# Patient Record
Sex: FEMALE | Race: OTHER | Employment: STUDENT | URBAN - METROPOLITAN AREA
[De-identification: names, ages, dates, MRNs, and addresses within clinical notes are randomized per-mention and may not be internally consistent; named-entity substitution may affect disease eponyms.]

---
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
## 2025-02-25 ENCOUNTER — OFFICE VISIT (OUTPATIENT)
Dept: OTOLARYNGOLOGY | Facility: CLINIC | Age: 20
End: 2025-02-25

## 2025-02-25 DIAGNOSIS — H60.393 OTHER INFECTIVE ACUTE OTITIS EXTERNA OF BOTH EARS: Primary | ICD-10-CM

## 2025-02-25 PROCEDURE — 99203 OFFICE O/P NEW LOW 30 MIN: CPT | Performed by: OTOLARYNGOLOGY

## 2025-02-25 PROCEDURE — 92504 EAR MICROSCOPY EXAMINATION: CPT | Performed by: OTOLARYNGOLOGY

## 2025-02-25 RX ORDER — CIPROFLOXACIN AND DEXAMETHASONE 3; 1 MG/ML; MG/ML
3 SUSPENSION/ DROPS AURICULAR (OTIC) 3 TIMES DAILY
Qty: 1 EACH | Refills: 0 | Status: SHIPPED | OUTPATIENT
Start: 2025-02-25 | End: 2025-03-14

## 2025-02-25 RX ORDER — CIPROFLOXACIN 500 MG/1
500 TABLET, FILM COATED ORAL EVERY 12 HOURS
Qty: 14 TABLET | Refills: 0 | Status: SHIPPED | OUTPATIENT
Start: 2025-02-25

## 2025-02-25 NOTE — PROGRESS NOTES
Rosina John is a 20 year old female.    Chief Complaint   Patient presents with    Ear Problem     Reports Bilateral double ear infection, C/o hearing decrease. X 2 days        HISTORY OF PRESENT ILLNESS  She presents with history of recurrent infections with about 2-year diagnosed since she has been to college.  She presents today having decreased hearing and increased ear pain is ago in the ED at her school in [facility] with a diagnosis of ear infection.  She was given amoxicillin had her ears irrigated and now presents with worsening pain and discomfort.  Cannot hear out of either ear.      Social History     Socioeconomic History    Marital status: Single   Tobacco Use    Smoking status: Never     Passive exposure: Never    Smokeless tobacco: Never   Vaping Use    Vaping status: Never Used   Substance and Sexual Activity    Alcohol use: Not Currently    Drug use: Never       History reviewed. No pertinent family history.    History reviewed. No pertinent past medical history.    History reviewed. No pertinent surgical history.      REVIEW OF SYSTEMS    System Neg/Pos Details   Constitutional Negative Fatigue, fever and weight loss.   ENMT Negative Drooling.   Eyes Negative Blurred vision and vision changes.   Respiratory Negative Dyspnea and wheezing.   Cardio Negative Chest pain, irregular heartbeat/palpitations and syncope.   GI Negative Abdominal pain and diarrhea.   Endocrine Negative Cold intolerance and heat intolerance.   Neuro Negative Tremors.   Psych Negative Anxiety and depression.   Integumentary Negative Frequent skin infections, pigment change and rash.   Hema/Lymph Negative Easy bleeding and easy bruising.           PHYSICAL EXAM    There were no vitals taken for this visit.       Constitutional Normal Overall appearance - Normal.   Psychiatric Normal Orientation - Oriented to time, place, person & situation. Appropriate mood and affect.   Neck Exam Normal  Inspection - Normal. Palpation - Normal. Parotid gland - Normal. Thyroid gland - Normal.   Eyes Normal Conjunctiva - Right: Normal, Left: Normal. Pupil - Right: Normal, Left: Normal. Fundus - Right: Normal, Left: Normal.   Neurological Normal Memory - Normal. Cranial nerves - Cranial nerves II through XII grossly intact.   Head/Face Normal Facial features - Normal. Eyebrows - Normal. Skull - Normal.        Nasopharynx Normal External nose - Normal. Lips/teeth/gums - Normal. Tonsils - Normal. Oropharynx - Normal.   Ears Normal Inspection - Right: Normal, Left: Normal. Canal - Right: Narrowed infected left: Narrowed infected TM - Right: Normal, Left: Normal.   Skin Normal Inspection - Normal.        Lymph Detail Normal Submental. Submandibular. Anterior cervical. Posterior cervical. Supraclavicular.        Nose/Mouth/Throat Normal External nose - Normal. Lips/teeth/gums - Normal. Tonsils - Normal. Oropharynx - Normal.   Nose/Mouth/Throat Normal Nares - Right: Normal Left: Normal. Septum -Normal  Turbinates - Right: Normal, Left: Normal.   Microscopy  Binocular microscopy was performed. The affected ear(s) was/were examined and all debris removed using suction. The findings are described in the physical exam.   Bilateral otitis externa ear canals very narrowed dilated with a speculum cleaned with suction and microscopy followed by placement of a wick bilaterally.    Current Outpatient Medications:     amoxicillin clavulanate 875-125 MG Oral Tab, , Disp: , Rfl:   ASSESSMENT AND PLAN    1. Other infective acute otitis externa of both ears  Ears cleaned bilaterally of debris maria victoria placed bilaterally I did instill some ofloxacin eardrops bilaterally and she will return to see me in 2 days for possible removal of the maria victoria strict water precautions start Ciprodex eardrops as well as ciprofloxacin twice daily stop amoxicillin.        This note was prepared using Dragon [employer] voice recognition dictation software. As a result  errors may occur. When identified these errors have been corrected. While every attempt is made to correct errors during dictation discrepancies may still exist    Timoteo Enriquez MD    2/25/2025    1:32 PM

## 2025-02-27 ENCOUNTER — OFFICE VISIT (OUTPATIENT)
Dept: OTOLARYNGOLOGY | Facility: CLINIC | Age: 20
End: 2025-02-27

## 2025-02-27 DIAGNOSIS — H60.393 OTHER INFECTIVE ACUTE OTITIS EXTERNA OF BOTH EARS: Primary | ICD-10-CM

## 2025-02-27 RX ORDER — CYCLOBENZAPRINE HCL 5 MG
5 TABLET ORAL
COMMUNITY
Start: 2024-08-22

## 2025-02-27 RX ORDER — ACETAMINOPHEN 325 MG/1
325 TABLET ORAL EVERY 4 HOURS PRN
COMMUNITY

## 2025-03-02 NOTE — PROGRESS NOTES
Rosina John is a 20 year old female.    Chief Complaint   Patient presents with    Follow - Up     Patient is here due to remove ear maria victoria        HISTORY OF PRESENT ILLNESS  She presents with history of recurrent infections with about 2-year diagnosed since she has been to college.  She presents today having decreased hearing and increased ear pain is ago in the ED at her school in [facility] with a diagnosis of ear infection.  She was given amoxicillin had her ears irrigated and now presents with worsening pain and discomfort.  Cannot hear out of either ear.     2/27/25 doing much better no further pain at this time after using ciprofloxacin which was switched from Augmentin previously.  Additionally using eardrops bilaterally.  Here for removal of maria victoria and debridement.  No complaints or concerns following water precautions.      Social History     Socioeconomic History    Marital status: Single   Tobacco Use    Smoking status: Never     Passive exposure: Never    Smokeless tobacco: Never   Vaping Use    Vaping status: Never Used   Substance and Sexual Activity    Alcohol use: Not Currently    Drug use: Never       Family History   Problem Relation Age of Onset    Other (Other) Father         Multiple Sclerosis    Diabetes Paternal Grandmother     Hypertension Maternal Grandmother     Hypertension Maternal Grandfather        History reviewed. No pertinent past medical history.    History reviewed. No pertinent surgical history.      REVIEW OF SYSTEMS    System Neg/Pos Details   Constitutional Negative Fatigue, fever and weight loss.   ENMT Negative Drooling.   Eyes Negative Blurred vision and vision changes.   Respiratory Negative Dyspnea and wheezing.   Cardio Negative Chest pain, irregular heartbeat/palpitations and syncope.   GI Negative Abdominal pain and diarrhea.   Endocrine Negative Cold intolerance and heat intolerance.   Neuro Negative Tremors.   Psych Negative  Anxiety and depression.   Integumentary Negative Frequent skin infections, pigment change and rash.   Hema/Lymph Negative Easy bleeding and easy bruising.           PHYSICAL EXAM    There were no vitals taken for this visit.       Constitutional Normal Overall appearance - Normal.   Psychiatric Normal Orientation - Oriented to time, place, person & situation. Appropriate mood and affect.   Neck Exam Normal Inspection - Normal. Palpation - Normal. Parotid gland - Normal. Thyroid gland - Normal.   Eyes Normal Conjunctiva - Right: Normal, Left: Normal. Pupil - Right: Normal, Left: Normal. Fundus - Right: Normal, Left: Normal.   Neurological Normal Memory - Normal. Cranial nerves - Cranial nerves II through XII grossly intact.   Head/Face Normal Facial features - Normal. Eyebrows - Normal. Skull - Normal.        Nasopharynx Normal External nose - Normal. Lips/teeth/gums - Normal. Tonsils - Normal. Oropharynx - Normal.   Ears Normal Inspection - Right: Normal, Left: Normal. Canal - Right: Normal, Left: Normal. TM - Right: Normal, Left: Normal.   Skin Normal Inspection - Normal.        Lymph Detail Normal Submental. Submandibular. Anterior cervical. Posterior cervical. Supraclavicular.        Nose/Mouth/Throat Normal External nose - Normal. Lips/teeth/gums - Normal. Tonsils - Normal. Oropharynx - Normal.   Nose/Mouth/Throat Normal Nares - Right: Normal Left: Normal. Septum -Normal  Turbinates - Right: Normal, Left: Normal.   Microscopy  Binocular microscopy was performed. The affected ear(s) was/were examined and all debris removed using suction. The findings are described in the physical exam.  Ear canals cleared bilaterally using suction microscopy.  Significant near complete resolution of infection on the right much improved on the left but still some swelling of the canal.      Current Outpatient Medications:     acetaminophen 325 MG Oral Tab, Take 1 tablet (325 mg total) by mouth every 4 (four) hours as needed.,  Disp: , Rfl:     cyclobenzaprine 5 MG Oral Tab, Take 1 tablet (5 mg total) by mouth., Disp: , Rfl:     amoxicillin clavulanate 875-125 MG Oral Tab, , Disp: , Rfl:     ciprofloxacin-dexamethasone 0.3-0.1 % Otic Suspension, Place 3 drops into both ears in the morning, at noon, and at bedtime for 17 days., Disp: 1 each, Rfl: 0    ciprofloxacin 500 MG Oral Tab, Take 1 tablet (500 mg total) by mouth every 12 (twelve) hours., Disp: 14 tablet, Rfl: 0  ASSESSMENT AND PLAN    1. Other infective acute otitis externa of both ears  Wax removed bilaterally.  Both ears cleared of squamous debris using suction microscopy.  Left ear seems to have improved dramatically right ear has improved significantly but still not as good as the right ear.  At this time I have asked her to follow strict water cautions finish her oral antibiotics and continue with the eardrops for another week or so bilaterally.  Return to see me in a week or 2 for reevaluation and possible debridement.  - EAR MICROSCOPY EXAMINATION        This note was prepared using Dragon Medical voice recognition dictation software. As a result errors may occur. When identified these errors have been corrected. While every attempt is made to correct errors during dictation discrepancies may still exist    Timoteo Enriquez MD    3/2/2025    1:28 PM

## 2025-03-08 ENCOUNTER — OFFICE VISIT (OUTPATIENT)
Dept: OTOLARYNGOLOGY | Facility: CLINIC | Age: 20
End: 2025-03-08

## 2025-03-08 DIAGNOSIS — H60.393 OTHER INFECTIVE ACUTE OTITIS EXTERNA OF BOTH EARS: Primary | ICD-10-CM

## 2025-03-08 PROCEDURE — 99213 OFFICE O/P EST LOW 20 MIN: CPT | Performed by: OTOLARYNGOLOGY

## 2025-03-08 NOTE — PROGRESS NOTES
Rosina John is a 20 year old female.    Chief Complaint   Patient presents with    Follow - Up     Patient is here due to other infective acute otitis externa of both ears follow up        HISTORY OF PRESENT ILLNESS  She presents with history of recurrent infections with about 2-year diagnosed since she has been to college.  She presents today having decreased hearing and increased ear pain is ago in the ED at her school in [facility] with a diagnosis of ear infection.  She was given amoxicillin had her ears irrigated and now presents with worsening pain and discomfort.  Cannot hear out of either ear.     2/27/25 doing much better no further pain at this time after using ciprofloxacin which was switched from Augmentin previously.  Additionally using eardrops bilaterally.  Here for removal of maria victoria and debridement.  No complaints or concerns following water precautions.     3/8/25 she presents today with no pain or discomfort.  She is following strict water precautions.  Tries not to use earbuds or earplugs.  No other significant signs, symptoms or complaints.  Finished her oral antibiotics and has been using the drops up until recently bilaterally.      Social History     Socioeconomic History    Marital status: Single   Tobacco Use    Smoking status: Never     Passive exposure: Never    Smokeless tobacco: Never   Vaping Use    Vaping status: Never Used   Substance and Sexual Activity    Alcohol use: Not Currently    Drug use: Never       Family History   Problem Relation Age of Onset    Other (Other) Father         Multiple Sclerosis    Diabetes Paternal Grandmother     Hypertension Maternal Grandmother     Hypertension Maternal Grandfather        History reviewed. No pertinent past medical history.    History reviewed. No pertinent surgical history.      REVIEW OF SYSTEMS    System Neg/Pos Details   Constitutional Negative Fatigue, fever and weight loss.   ENMT Negative  Drooling.   Eyes Negative Blurred vision and vision changes.   Respiratory Negative Dyspnea and wheezing.   Cardio Negative Chest pain, irregular heartbeat/palpitations and syncope.   GI Negative Abdominal pain and diarrhea.   Endocrine Negative Cold intolerance and heat intolerance.   Neuro Negative Tremors.   Psych Negative Anxiety and depression.   Integumentary Negative Frequent skin infections, pigment change and rash.   Hema/Lymph Negative Easy bleeding and easy bruising.           PHYSICAL EXAM    There were no vitals taken for this visit.       Constitutional Normal Overall appearance - Normal.   Psychiatric Normal Orientation - Oriented to time, place, person & situation. Appropriate mood and affect.   Neck Exam Normal Inspection - Normal. Palpation - Normal. Parotid gland - Normal. Thyroid gland - Normal.   Eyes Normal Conjunctiva - Right: Normal, Left: Normal. Pupil - Right: Normal, Left: Normal. Fundus - Right: Normal, Left: Normal.   Neurological Normal Memory - Normal. Cranial nerves - Cranial nerves II through XII grossly intact.   Head/Face Normal Facial features - Normal. Eyebrows - Normal. Skull - Normal.        Nasopharynx Normal External nose - Normal. Lips/teeth/gums - Normal. Tonsils - Normal. Oropharynx - Normal.   Ears Normal Inspection - Right: Normal, Left: Normal. Canal - Right: Normal, Left: Normal. TM - Right: Normal, Left: Normal.   Skin Normal Inspection - Normal.        Lymph Detail Normal Submental. Submandibular. Anterior cervical. Posterior cervical. Supraclavicular.        Nose/Mouth/Throat Normal External nose - Normal. Lips/teeth/gums - Normal. Tonsils - Normal. Oropharynx - Normal.   Nose/Mouth/Throat Normal Nares - Right: Normal Left: Normal. Septum -Normal  Turbinates - Right: Normal, Left: Normal.       Current Outpatient Medications:     acetaminophen 325 MG Oral Tab, Take 1 tablet (325 mg total) by mouth every 4 (four) hours as needed., Disp: , Rfl:     cyclobenzaprine 5  MG Oral Tab, Take 1 tablet (5 mg total) by mouth., Disp: , Rfl:     amoxicillin clavulanate 875-125 MG Oral Tab, , Disp: , Rfl:     ciprofloxacin-dexamethasone 0.3-0.1 % Otic Suspension, Place 3 drops into both ears in the morning, at noon, and at bedtime for 17 days., Disp: 1 each, Rfl: 0    ciprofloxacin 500 MG Oral Tab, Take 1 tablet (500 mg total) by mouth every 12 (twelve) hours., Disp: 14 tablet, Rfl: 0  ASSESSMENT AND PLAN    1. Other infective acute otitis externa of both ears  Both ears are completely clear no evidence of infection I did recommend strict water precautions when showering or bathing and to use a hair dryer at the end of the evening to dry her ear.  Do not use earbuds or earplugs and if she does need to amplify sound or listen to music she should use over the ear muffs.  Return to see me as needed        This note was prepared using Dragon Medical voice recognition dictation software. As a result errors may occur. When identified these errors have been corrected. While every attempt is made to correct errors during dictation discrepancies may still exist    Timoteo Enriquez MD    3/8/2025    9:57 AM

## 2025-05-27 ENCOUNTER — OFFICE VISIT (OUTPATIENT)
Dept: OTOLARYNGOLOGY | Facility: CLINIC | Age: 20
End: 2025-05-27

## 2025-05-27 ENCOUNTER — PATIENT MESSAGE (OUTPATIENT)
Dept: OTOLARYNGOLOGY | Facility: CLINIC | Age: 20
End: 2025-05-27

## 2025-05-27 VITALS — WEIGHT: 220 LBS | BODY MASS INDEX: 37 KG/M2

## 2025-05-27 DIAGNOSIS — H91.90 HEARING LOSS, UNSPECIFIED HEARING LOSS TYPE, UNSPECIFIED LATERALITY: Primary | ICD-10-CM

## 2025-05-27 DIAGNOSIS — H62.40 FUNGAL OTITIS EXTERNA: ICD-10-CM

## 2025-05-27 DIAGNOSIS — B36.9 FUNGAL OTITIS EXTERNA: ICD-10-CM

## 2025-05-27 PROCEDURE — 92504 EAR MICROSCOPY EXAMINATION: CPT | Performed by: OTOLARYNGOLOGY

## 2025-05-27 PROCEDURE — 99213 OFFICE O/P EST LOW 20 MIN: CPT | Performed by: OTOLARYNGOLOGY

## 2025-05-27 RX ORDER — ACETIC ACID 20.65 MG/ML
4 SOLUTION AURICULAR (OTIC) 3 TIMES DAILY
Qty: 1 EACH | Refills: 0 | Status: SHIPPED | OUTPATIENT
Start: 2025-05-27 | End: 2025-05-28

## 2025-05-27 RX ORDER — FLUCONAZOLE 100 MG/1
100 TABLET ORAL DAILY
Qty: 7 TABLET | Refills: 0 | Status: SHIPPED | OUTPATIENT
Start: 2025-05-27 | End: 2025-05-28

## 2025-05-27 NOTE — PROGRESS NOTES
Rosina John is a 20 year old female.    Chief Complaint   Patient presents with    Ear Problem     Patient is here for difficult to hear from both ears reports muffled ears. Reports itchy inside of both ears.       HISTORY OF PRESENT ILLNESS  Otitis externa in the past.  Now presents with a 1 week history of muffled hearing bilaterally.  Very itchy bilaterally.  Does try to follow water precautions as best that she can.      Social Hx on file[1]    Family History[2]    Past Medical History[3]    Past Surgical History[4]      REVIEW OF SYSTEMS    System Neg/Pos Details   Constitutional Negative Fatigue, fever and weight loss.   ENMT Negative Drooling.   Eyes Negative Blurred vision and vision changes.   Respiratory Negative Dyspnea and wheezing.   Cardio Negative Chest pain, irregular heartbeat/palpitations and syncope.   GI Negative Abdominal pain and diarrhea.   Endocrine Negative Cold intolerance and heat intolerance.   Neuro Negative Tremors.   Psych Negative Anxiety and depression.   Integumentary Negative Frequent skin infections, pigment change and rash.   Hema/Lymph Negative Easy bleeding and easy bruising.           PHYSICAL EXAM    Wt 220 lb (99.8 kg)   BMI 36.61 kg/m²        Constitutional Normal Overall appearance - Normal.   Psychiatric Normal Orientation - Oriented to time, place, person & situation. Appropriate mood and affect.   Neck Exam Normal Inspection - Normal. Palpation - Normal. Parotid gland - Normal. Thyroid gland - Normal.   Eyes Normal Conjunctiva - Right: Normal, Left: Normal. Pupil - Right: Normal, Left: Normal. Fundus - Right: Normal, Left: Normal.   Neurological Normal Memory - Normal. Cranial nerves - Cranial nerves II through XII grossly intact.   Head/Face Normal Facial features - Normal. Eyebrows - Normal. Skull - Normal.        Nasopharynx Normal External nose - Normal. Lips/teeth/gums - Normal. Tonsils - Normal. Oropharynx - Normal.   Ears Normal Inspection - Right: Normal,  Left: Normal. Canal - Right: Normal, Left: Normal. TM - Right: Erythematous left: Erythematous.  Fungal infection bilaterally   Skin Normal Inspection - Normal.        Lymph Detail Normal Submental. Submandibular. Anterior cervical. Posterior cervical. Supraclavicular.        Nose/Mouth/Throat Normal External nose - Normal. Lips/teeth/gums - Normal. Tonsils - Normal. Oropharynx - Normal.   Nose/Mouth/Throat Normal Nares - Right: Normal Left: Normal. Septum -Normal  Turbinates - Right: Normal, Left: Normal.   Microscopy  Binocular microscopy was performed. The affected ear(s) was/were examined and all debris removed using suction. The findings are described in the physical exam.  Fungal debris removed bilaterally with suction microscopy.  Erythema of the tympanic membrane surfaces bilaterally.  No erosions no perforations.    Medications - Current[5]  ASSESSMENT AND PLAN    1. Hearing loss, unspecified hearing loss type, unspecified laterality  - Audiology Referral - [facility])    2. Fungal otitis externa  Ears debrided of fungal debris bilaterally.  Acetic acid drops placed.  Start Diflucan for 7 days as well as acetic acid strict water precautions return to see me in 1 week for further debridement        This note was prepared using Dragon [employer] voice recognition dictation software. As a result errors may occur. When identified these errors have been corrected. While every attempt is made to correct errors during dictation discrepancies may still exist    Timoteo Enriquez MD    5/27/2025    1:10 PM         [1]   Social History  Socioeconomic History    Marital status: Single   Tobacco Use    Smoking status: Never     Passive exposure: Never    Smokeless tobacco: Never   Vaping Use    Vaping status: Never Used   Substance and Sexual Activity    Alcohol use: Not Currently    Drug use: Never   [2]   Family History  Problem Relation Age of Onset    Other (Other) Father         Multiple Sclerosis     Diabetes Paternal Grandmother     Hypertension Maternal Grandmother     Hypertension Maternal Grandfather    [3] History reviewed. No pertinent past medical history.  [4] History reviewed. No pertinent surgical history.  [5]   Current Outpatient Medications:     acetaminophen 325 MG Oral Tab, Take 1 tablet (325 mg total) by mouth every 4 (four) hours as needed., Disp: , Rfl:     cyclobenzaprine 5 MG Oral Tab, Take 1 tablet (5 mg total) by mouth., Disp: , Rfl:     amoxicillin clavulanate 875-125 MG Oral Tab, , Disp: , Rfl:     ciprofloxacin 500 MG Oral Tab, Take 1 tablet (500 mg total) by mouth every 12 (twelve) hours., Disp: 14 tablet, Rfl: 0

## 2025-05-28 RX ORDER — FLUCONAZOLE 100 MG/1
100 TABLET ORAL DAILY
Qty: 7 TABLET | Refills: 0 | Status: SHIPPED | OUTPATIENT
Start: 2025-05-28

## 2025-05-28 RX ORDER — ACETIC ACID 20.65 MG/ML
4 SOLUTION AURICULAR (OTIC) 3 TIMES DAILY
Qty: 1 EACH | Refills: 0 | Status: SHIPPED | OUTPATIENT
Start: 2025-05-28 | End: 2025-06-04

## 2025-06-03 ENCOUNTER — OFFICE VISIT (OUTPATIENT)
Dept: OTOLARYNGOLOGY | Facility: CLINIC | Age: 20
End: 2025-06-03

## 2025-06-03 DIAGNOSIS — H62.40 FUNGAL OTITIS EXTERNA: Primary | ICD-10-CM

## 2025-06-03 DIAGNOSIS — B36.9 FUNGAL OTITIS EXTERNA: Primary | ICD-10-CM

## 2025-06-03 PROCEDURE — 99213 OFFICE O/P EST LOW 20 MIN: CPT | Performed by: OTOLARYNGOLOGY

## 2025-06-03 PROCEDURE — 92504 EAR MICROSCOPY EXAMINATION: CPT | Performed by: OTOLARYNGOLOGY

## 2025-06-03 NOTE — PROGRESS NOTES
Rosina John is a 20 year old female.    Chief Complaint   Patient presents with    Follow - Up     Patient is here due to ear cleaning       HISTORY OF PRESENT ILLNESS  Otitis externa in the past.  Now presents with a 1 week history of muffled hearing bilaterally.  Very itchy bilaterally.  Does try to follow water precautions as best that she can.     6/3/25 Last visit noted to have fungal debris started on acetic acid drops and Diflucan.  Now doing very well no complaints or concerns.  Following strict water precautions.  Here for reevaluation and possible debridement      Social Hx on file[1]    Family History[2]    Past Medical History[3]    Past Surgical History[4]      REVIEW OF SYSTEMS    System Neg/Pos Details   Constitutional Negative Fatigue, fever and weight loss.   ENMT Negative Drooling.   Eyes Negative Blurred vision and vision changes.   Respiratory Negative Dyspnea and wheezing.   Cardio Negative Chest pain, irregular heartbeat/palpitations and syncope.   GI Negative Abdominal pain and diarrhea.   Endocrine Negative Cold intolerance and heat intolerance.   Neuro Negative Tremors.   Psych Negative Anxiety and depression.   Integumentary Negative Frequent skin infections, pigment change and rash.   Hema/Lymph Negative Easy bleeding and easy bruising.           PHYSICAL EXAM    There were no vitals taken for this visit.       Constitutional Normal Overall appearance - Normal.   Psychiatric Normal Orientation - Oriented to time, place, person & situation. Appropriate mood and affect.   Neck Exam Normal Inspection - Normal. Palpation - Normal. Parotid gland - Normal. Thyroid gland - Normal.   Eyes Normal Conjunctiva - Right: Normal, Left: Normal. Pupil - Right: Normal, Left: Normal. Fundus - Right: Normal, Left: Normal.   Neurological Normal Memory - Normal. Cranial nerves - Cranial nerves II through XII grossly intact.   Head/Face Normal Facial features - Normal. Eyebrows - Normal. Skull - Normal.         Nasopharynx Normal External nose - Normal. Lips/teeth/gums - Normal. Tonsils - Normal. Oropharynx - Normal.   Ears Normal Inspection - Right: Normal, Left: Normal. Canal - Right: Normal, Left: Normal. TM - Right: Normal, Left: Normal.   Skin Normal Inspection - Normal.        Lymph Detail Normal Submental. Submandibular. Anterior cervical. Posterior cervical. Supraclavicular.        Nose/Mouth/Throat Normal External nose - Normal. Lips/teeth/gums - Normal. Tonsils - Normal. Oropharynx - Normal.   Nose/Mouth/Throat Normal Nares - Right: Normal Left: Normal. Septum -Normal  Turbinates - Right: Normal, Left: Normal.   Microscopy  Binocular microscopy was performed. The affected ear(s) was/were examined and all debris removed using suction. The findings are described in the physical exam.  Ear cleaned on the right using suction microscopy.  Resolved fungal infection    Medications - Current[5]  ASSESSMENT AND PLAN    1. Fungal otitis externa  Ear cleaned of all remaining debris on the right.  Painted with gentian violet.  Strict water precautions discussed and understood stop acetic acid drops return to see me as needed.  - EAR MICROSCOPY EXAMINATION        This note was prepared using Dragon Medical voice recognition dictation software. As a result errors may occur. When identified these errors have been corrected. While every attempt is made to correct errors during dictation discrepancies may still exist    Timoteo Enriquez MD    6/3/2025    1:35 PM         [1]   Social History  Socioeconomic History    Marital status: Single   Tobacco Use    Smoking status: Never     Passive exposure: Never    Smokeless tobacco: Never   Vaping Use    Vaping status: Never Used   Substance and Sexual Activity    Alcohol use: Not Currently    Drug use: Never   [2]   Family History  Problem Relation Age of Onset    Other (Other) Father         Multiple Sclerosis    Diabetes Paternal Grandmother     Hypertension Maternal  Grandmother     Hypertension Maternal Grandfather    [3] History reviewed. No pertinent past medical history.  [4] History reviewed. No pertinent surgical history.  [5]   Current Outpatient Medications:     fluconazole 100 MG Oral Tab, Take 1 tablet (100 mg total) by mouth daily. (Patient not taking: Reported on 6/3/2025), Disp: 7 tablet, Rfl: 0    acetic acid 2 % Otic Solution, Place 4 drops into both ears 3 (three) times daily for 7 days. (Patient not taking: Reported on 6/3/2025), Disp: 1 each, Rfl: 0    acetaminophen 325 MG Oral Tab, Take 1 tablet (325 mg total) by mouth every 4 (four) hours as needed. (Patient not taking: Reported on 6/3/2025), Disp: , Rfl:     cyclobenzaprine 5 MG Oral Tab, Take 1 tablet (5 mg total) by mouth. (Patient not taking: Reported on 6/3/2025), Disp: , Rfl:     amoxicillin clavulanate 875-125 MG Oral Tab, , Disp: , Rfl:     ciprofloxacin 500 MG Oral Tab, Take 1 tablet (500 mg total) by mouth every 12 (twelve) hours. (Patient not taking: Reported on 6/3/2025), Disp: 14 tablet, Rfl: 0

## (undated) NOTE — LETTER
[facility] [employer] of Child Health Examination       Student's Name  Brad DANG Title      MD                     Date  6/30/2020   Signature                                                                                                                                              Title HEALTH HISTORY          TO BE COMPLETED AND SIGNED BY PARENT/GUARDIAN AND VERIFIED BY HEALTH CARE PROVIDER    ALLERGIES  (Food, drug, insect, other) MEDICATION  (List all prescribed or taken on a regular basis.)     Diagnosis of asthma?   Child wakes during DIABETES SCREENING  BMI>85% age/sex yes And any two of the following:  Family History  yes Ethnic Minority  yes         Signs of Insulin Resistance (hypertension, dyslipidemia, polycystic ovarian syndrome, acanthosis nigricans)    No           At Risk  Yes Quick-relief  medication (e.g. Short Acting Beta Antagonist): No          Controller medication (e.g. inhaled corticosteroid):   No Other   NEEDS/MODIFICATIONS required in the school setting  None DIETARY Needs/Restrictions     None   SPECIAL INSTR

## (undated) NOTE — LETTER
[facility] [employer] Child [employer] Examination       Student's Name  Alyssa Stein D Title           MD                Date   11/11/2021   Signature                                                                                                                                              Ti SIGNED BY PARENT/GUARDIAN AND VERIFIED BY HEALTH CARE PROVIDER    ALLERGIES  (Food, drug, insect, other) MEDICATION  (List all prescribed or taken on a regular basis.)     Diagnosis of asthma?   Child wakes during the night coughing   Yes   No    Yes   No two of the following:  Family History No   Ethnic Minority  No          Signs of Insulin Resistance (hypertension, dyslipidemia, polycystic ovarian syndrome, acanthosis nigricans)    No           At Risk  No   Lead Risk Questionnaire  Req'd for children 6 medication (e.g. inhaled corticosteroid):   No Other   NEEDS/MODIFICATIONS required in the school setting  None DIETARY Needs/Restrictions     None   SPECIAL INSTRUCTIONS/DEVICES e.g. safety glasses, glass eye, chest protector for arrhythmia, pacemaker, pr

## (undated) NOTE — LETTER
2/25/2025          To Whom It May Concern:    Rosina MARLEN John is currently under my medical care and may not return to school and work at this time.    Please excuse Rosina for from 2/25/25 to 2/ 27/25  Activity is restricted as follows: none.    If you require additional information please contact our office.        Sincerely,    Timoteo Enriquez MD

## (undated) NOTE — LETTER
VACCINE ADMINISTRATION RECORD  PARENT / GUARDIAN APPROVAL  Date: 2021  Vaccine administered to: Ward Macias     : 2005    MRN: [id number]    A copy of the appropriate Centers for Disease Control and Prevention Vaccine Information statement

## (undated) NOTE — LETTER
[facility] [employer] Child [employer] Examination       Student's Name  Kia Stein Da Title                           Date     Signature HEALTH HISTORY          TO BE COMPLETED AND SIGNED BY PARENT/GUARDIAN AND VERIFIED BY HEALTH CARE PROVIDER    ALLERGIES  (Food, drug, insect, other)  Patient has no known allergies.  MEDICATION  (List all prescribed or taken on a regular basis.)  No current /75   Pulse 83   Ht 5' 5\" (1.651 m)   Wt 92.8 kg (204 lb 8 oz)   BMI 34.03 kg/m²     DIABETES SCREENING  BMI>85% age/sex  yes And any two of the following:  Family History yes    Ethnic Minority  No          Signs of Insulin Resistance (hypertension Currently Prescribed Asthma Medication:            Quick-relief  medication (e.g. Short Acting Beta Antagonist): No          Controller medication (e.g. inhaled corticosteroid):   No Other   NEEDS/MODIFICATIONS required in the school setting  None DIET